# Patient Record
Sex: MALE | Race: WHITE | HISPANIC OR LATINO | Employment: FULL TIME | URBAN - METROPOLITAN AREA
[De-identification: names, ages, dates, MRNs, and addresses within clinical notes are randomized per-mention and may not be internally consistent; named-entity substitution may affect disease eponyms.]

---

## 2021-10-11 ENCOUNTER — PRE-ADMISSION TESTING (OUTPATIENT)
Dept: ADMISSIONS | Facility: MEDICAL CENTER | Age: 36
End: 2021-10-11
Attending: SURGERY
Payer: COMMERCIAL

## 2021-10-11 DIAGNOSIS — Z01.812 PRE-OPERATIVE LABORATORY EXAMINATION: ICD-10-CM

## 2021-10-11 LAB
ANION GAP SERPL CALC-SCNC: 9 MMOL/L (ref 7–16)
BASOPHILS # BLD AUTO: 0.5 % (ref 0–1.8)
BASOPHILS # BLD: 0.04 K/UL (ref 0–0.12)
BUN SERPL-MCNC: 13 MG/DL (ref 8–22)
CALCIUM SERPL-MCNC: 9.1 MG/DL (ref 8.5–10.5)
CHLORIDE SERPL-SCNC: 105 MMOL/L (ref 96–112)
CO2 SERPL-SCNC: 25 MMOL/L (ref 20–33)
CREAT SERPL-MCNC: 0.81 MG/DL (ref 0.5–1.4)
EOSINOPHIL # BLD AUTO: 0.08 K/UL (ref 0–0.51)
EOSINOPHIL NFR BLD: 1 % (ref 0–6.9)
ERYTHROCYTE [DISTWIDTH] IN BLOOD BY AUTOMATED COUNT: 42.1 FL (ref 35.9–50)
GLUCOSE SERPL-MCNC: 121 MG/DL (ref 65–99)
HCT VFR BLD AUTO: 51 % (ref 42–52)
HGB BLD-MCNC: 18 G/DL (ref 14–18)
IMM GRANULOCYTES # BLD AUTO: 0.03 K/UL (ref 0–0.11)
IMM GRANULOCYTES NFR BLD AUTO: 0.4 % (ref 0–0.9)
LYMPHOCYTES # BLD AUTO: 1.46 K/UL (ref 1–4.8)
LYMPHOCYTES NFR BLD: 17.4 % (ref 22–41)
MCH RBC QN AUTO: 32.3 PG (ref 27–33)
MCHC RBC AUTO-ENTMCNC: 35.3 G/DL (ref 33.7–35.3)
MCV RBC AUTO: 91.4 FL (ref 81.4–97.8)
MONOCYTES # BLD AUTO: 0.54 K/UL (ref 0–0.85)
MONOCYTES NFR BLD AUTO: 6.5 % (ref 0–13.4)
NEUTROPHILS # BLD AUTO: 6.22 K/UL (ref 1.82–7.42)
NEUTROPHILS NFR BLD: 74.2 % (ref 44–72)
NRBC # BLD AUTO: 0 K/UL
NRBC BLD-RTO: 0 /100 WBC
PLATELET # BLD AUTO: 250 K/UL (ref 164–446)
PMV BLD AUTO: 9.3 FL (ref 9–12.9)
POTASSIUM SERPL-SCNC: 4.3 MMOL/L (ref 3.6–5.5)
RBC # BLD AUTO: 5.58 M/UL (ref 4.7–6.1)
SARS-COV-2 RNA RESP QL NAA+PROBE: NOTDETECTED
SODIUM SERPL-SCNC: 139 MMOL/L (ref 135–145)
SPECIMEN SOURCE: NORMAL
WBC # BLD AUTO: 8.4 K/UL (ref 4.8–10.8)

## 2021-10-11 PROCEDURE — 80048 BASIC METABOLIC PNL TOTAL CA: CPT

## 2021-10-11 PROCEDURE — C9803 HOPD COVID-19 SPEC COLLECT: HCPCS

## 2021-10-11 PROCEDURE — U0005 INFEC AGEN DETEC AMPLI PROBE: HCPCS

## 2021-10-11 PROCEDURE — 85025 COMPLETE CBC W/AUTO DIFF WBC: CPT

## 2021-10-11 PROCEDURE — 36415 COLL VENOUS BLD VENIPUNCTURE: CPT

## 2021-10-11 PROCEDURE — U0003 INFECTIOUS AGENT DETECTION BY NUCLEIC ACID (DNA OR RNA); SEVERE ACUTE RESPIRATORY SYNDROME CORONAVIRUS 2 (SARS-COV-2) (CORONAVIRUS DISEASE [COVID-19]), AMPLIFIED PROBE TECHNIQUE, MAKING USE OF HIGH THROUGHPUT TECHNOLOGIES AS DESCRIBED BY CMS-2020-01-R: HCPCS

## 2021-10-13 ENCOUNTER — HOSPITAL ENCOUNTER (OUTPATIENT)
Facility: MEDICAL CENTER | Age: 36
End: 2021-10-13
Attending: SURGERY | Admitting: SURGERY
Payer: COMMERCIAL

## 2021-10-13 ENCOUNTER — ANESTHESIA (OUTPATIENT)
Dept: SURGERY | Facility: MEDICAL CENTER | Age: 36
End: 2021-10-13
Payer: COMMERCIAL

## 2021-10-13 ENCOUNTER — ANESTHESIA EVENT (OUTPATIENT)
Dept: SURGERY | Facility: MEDICAL CENTER | Age: 36
End: 2021-10-13
Payer: COMMERCIAL

## 2021-10-13 VITALS
WEIGHT: 187.39 LBS | HEART RATE: 73 BPM | TEMPERATURE: 98.5 F | DIASTOLIC BLOOD PRESSURE: 69 MMHG | BODY MASS INDEX: 28.4 KG/M2 | SYSTOLIC BLOOD PRESSURE: 115 MMHG | RESPIRATION RATE: 16 BRPM | OXYGEN SATURATION: 94 % | HEIGHT: 68 IN

## 2021-10-13 DIAGNOSIS — K42.9 UMBILICAL HERNIA WITHOUT OBSTRUCTION AND WITHOUT GANGRENE: ICD-10-CM

## 2021-10-13 PROCEDURE — 501838 HCHG SUTURE GENERAL: Performed by: SURGERY

## 2021-10-13 PROCEDURE — 160028 HCHG SURGERY MINUTES - 1ST 30 MINS LEVEL 3: Performed by: SURGERY

## 2021-10-13 PROCEDURE — 160025 RECOVERY II MINUTES (STATS): Performed by: SURGERY

## 2021-10-13 PROCEDURE — 160009 HCHG ANES TIME/MIN: Performed by: SURGERY

## 2021-10-13 PROCEDURE — 160046 HCHG PACU - 1ST 60 MINS PHASE II: Performed by: SURGERY

## 2021-10-13 PROCEDURE — 700102 HCHG RX REV CODE 250 W/ 637 OVERRIDE(OP): Performed by: ANESTHESIOLOGY

## 2021-10-13 PROCEDURE — 700101 HCHG RX REV CODE 250: Performed by: SURGERY

## 2021-10-13 PROCEDURE — 700111 HCHG RX REV CODE 636 W/ 250 OVERRIDE (IP): Performed by: ANESTHESIOLOGY

## 2021-10-13 PROCEDURE — 700105 HCHG RX REV CODE 258: Performed by: SURGERY

## 2021-10-13 PROCEDURE — 700101 HCHG RX REV CODE 250: Performed by: ANESTHESIOLOGY

## 2021-10-13 PROCEDURE — 160048 HCHG OR STATISTICAL LEVEL 1-5: Performed by: SURGERY

## 2021-10-13 PROCEDURE — A9270 NON-COVERED ITEM OR SERVICE: HCPCS | Performed by: ANESTHESIOLOGY

## 2021-10-13 PROCEDURE — 160002 HCHG RECOVERY MINUTES (STAT): Performed by: SURGERY

## 2021-10-13 PROCEDURE — 160035 HCHG PACU - 1ST 60 MINS PHASE I: Performed by: SURGERY

## 2021-10-13 PROCEDURE — 160039 HCHG SURGERY MINUTES - EA ADDL 1 MIN LEVEL 3: Performed by: SURGERY

## 2021-10-13 RX ORDER — HYDROMORPHONE HYDROCHLORIDE 1 MG/ML
0.1 INJECTION, SOLUTION INTRAMUSCULAR; INTRAVENOUS; SUBCUTANEOUS
Status: DISCONTINUED | OUTPATIENT
Start: 2021-10-13 | End: 2021-10-13 | Stop reason: HOSPADM

## 2021-10-13 RX ORDER — CELECOXIB 200 MG/1
400 CAPSULE ORAL ONCE
Status: COMPLETED | OUTPATIENT
Start: 2021-10-13 | End: 2021-10-13

## 2021-10-13 RX ORDER — BUPIVACAINE HYDROCHLORIDE AND EPINEPHRINE 5; 5 MG/ML; UG/ML
INJECTION, SOLUTION EPIDURAL; INTRACAUDAL; PERINEURAL
Status: DISCONTINUED
Start: 2021-10-13 | End: 2021-10-13 | Stop reason: HOSPADM

## 2021-10-13 RX ORDER — OXYCODONE HCL 5 MG/5 ML
5 SOLUTION, ORAL ORAL
Status: COMPLETED | OUTPATIENT
Start: 2021-10-13 | End: 2021-10-13

## 2021-10-13 RX ORDER — HYDROMORPHONE HYDROCHLORIDE 1 MG/ML
0.4 INJECTION, SOLUTION INTRAMUSCULAR; INTRAVENOUS; SUBCUTANEOUS
Status: DISCONTINUED | OUTPATIENT
Start: 2021-10-13 | End: 2021-10-13 | Stop reason: HOSPADM

## 2021-10-13 RX ORDER — ACETAMINOPHEN 500 MG
1000 TABLET ORAL ONCE
Status: COMPLETED | OUTPATIENT
Start: 2021-10-13 | End: 2021-10-13

## 2021-10-13 RX ORDER — LIDOCAINE HYDROCHLORIDE 20 MG/ML
INJECTION, SOLUTION EPIDURAL; INFILTRATION; INTRACAUDAL; PERINEURAL PRN
Status: DISCONTINUED | OUTPATIENT
Start: 2021-10-13 | End: 2021-10-13 | Stop reason: SURG

## 2021-10-13 RX ORDER — SODIUM CHLORIDE, SODIUM LACTATE, POTASSIUM CHLORIDE, CALCIUM CHLORIDE 600; 310; 30; 20 MG/100ML; MG/100ML; MG/100ML; MG/100ML
INJECTION, SOLUTION INTRAVENOUS CONTINUOUS
Status: DISCONTINUED | OUTPATIENT
Start: 2021-10-13 | End: 2021-10-13 | Stop reason: HOSPADM

## 2021-10-13 RX ORDER — PHENYLEPHRINE HCL IN 0.9% NACL 0.5 MG/5ML
SYRINGE (ML) INTRAVENOUS PRN
Status: DISCONTINUED | OUTPATIENT
Start: 2021-10-13 | End: 2021-10-13 | Stop reason: SURG

## 2021-10-13 RX ORDER — BUPIVACAINE HYDROCHLORIDE 5 MG/ML
INJECTION, SOLUTION EPIDURAL; INTRACAUDAL
Status: DISCONTINUED | OUTPATIENT
Start: 2021-10-13 | End: 2021-10-13 | Stop reason: HOSPADM

## 2021-10-13 RX ORDER — ROCURONIUM BROMIDE 10 MG/ML
INJECTION, SOLUTION INTRAVENOUS PRN
Status: DISCONTINUED | OUTPATIENT
Start: 2021-10-13 | End: 2021-10-13 | Stop reason: SURG

## 2021-10-13 RX ORDER — OXYCODONE HCL 5 MG/5 ML
10 SOLUTION, ORAL ORAL
Status: COMPLETED | OUTPATIENT
Start: 2021-10-13 | End: 2021-10-13

## 2021-10-13 RX ORDER — DEXAMETHASONE SODIUM PHOSPHATE 4 MG/ML
INJECTION, SOLUTION INTRA-ARTICULAR; INTRALESIONAL; INTRAMUSCULAR; INTRAVENOUS; SOFT TISSUE PRN
Status: DISCONTINUED | OUTPATIENT
Start: 2021-10-13 | End: 2021-10-13 | Stop reason: SURG

## 2021-10-13 RX ORDER — MEPERIDINE HYDROCHLORIDE 25 MG/ML
12.5 INJECTION INTRAMUSCULAR; INTRAVENOUS; SUBCUTANEOUS
Status: DISCONTINUED | OUTPATIENT
Start: 2021-10-13 | End: 2021-10-13 | Stop reason: HOSPADM

## 2021-10-13 RX ORDER — HYDROMORPHONE HYDROCHLORIDE 1 MG/ML
0.2 INJECTION, SOLUTION INTRAMUSCULAR; INTRAVENOUS; SUBCUTANEOUS
Status: DISCONTINUED | OUTPATIENT
Start: 2021-10-13 | End: 2021-10-13 | Stop reason: HOSPADM

## 2021-10-13 RX ORDER — HYDROCODONE BITARTRATE AND ACETAMINOPHEN 5; 325 MG/1; MG/1
1 TABLET ORAL EVERY 6 HOURS PRN
Qty: 16 TABLET | Refills: 0 | Status: SHIPPED | OUTPATIENT
Start: 2021-10-13 | End: 2021-10-17

## 2021-10-13 RX ORDER — HALOPERIDOL 5 MG/ML
1 INJECTION INTRAMUSCULAR
Status: DISCONTINUED | OUTPATIENT
Start: 2021-10-13 | End: 2021-10-13 | Stop reason: HOSPADM

## 2021-10-13 RX ORDER — CEFAZOLIN SODIUM 1 G/3ML
INJECTION, POWDER, FOR SOLUTION INTRAMUSCULAR; INTRAVENOUS PRN
Status: DISCONTINUED | OUTPATIENT
Start: 2021-10-13 | End: 2021-10-13 | Stop reason: SURG

## 2021-10-13 RX ORDER — MIDAZOLAM HYDROCHLORIDE 1 MG/ML
INJECTION INTRAMUSCULAR; INTRAVENOUS
Status: COMPLETED
Start: 2021-10-13 | End: 2021-10-13

## 2021-10-13 RX ORDER — ONDANSETRON 2 MG/ML
4 INJECTION INTRAMUSCULAR; INTRAVENOUS
Status: DISCONTINUED | OUTPATIENT
Start: 2021-10-13 | End: 2021-10-13 | Stop reason: HOSPADM

## 2021-10-13 RX ADMIN — CEFAZOLIN 2 G: 330 INJECTION, POWDER, FOR SOLUTION INTRAMUSCULAR; INTRAVENOUS at 14:16

## 2021-10-13 RX ADMIN — SODIUM CHLORIDE, POTASSIUM CHLORIDE, SODIUM LACTATE AND CALCIUM CHLORIDE: 600; 310; 30; 20 INJECTION, SOLUTION INTRAVENOUS at 14:06

## 2021-10-13 RX ADMIN — PROPOFOL 200 MG: 10 INJECTION, EMULSION INTRAVENOUS at 14:16

## 2021-10-13 RX ADMIN — SUGAMMADEX 200 MG: 100 INJECTION, SOLUTION INTRAVENOUS at 14:58

## 2021-10-13 RX ADMIN — DEXAMETHASONE SODIUM PHOSPHATE 8 MG: 4 INJECTION, SOLUTION INTRA-ARTICULAR; INTRALESIONAL; INTRAMUSCULAR; INTRAVENOUS; SOFT TISSUE at 14:22

## 2021-10-13 RX ADMIN — FENTANYL CITRATE 25 MCG: 50 INJECTION INTRAMUSCULAR; INTRAVENOUS at 15:30

## 2021-10-13 RX ADMIN — ACETAMINOPHEN 1000 MG: 500 TABLET ORAL at 14:04

## 2021-10-13 RX ADMIN — MIDAZOLAM 2 MG: 1 INJECTION INTRAMUSCULAR; INTRAVENOUS at 14:08

## 2021-10-13 RX ADMIN — FENTANYL CITRATE 150 MCG: 50 INJECTION, SOLUTION INTRAMUSCULAR; INTRAVENOUS at 14:16

## 2021-10-13 RX ADMIN — OXYCODONE HYDROCHLORIDE 5 MG: 5 SOLUTION ORAL at 15:30

## 2021-10-13 RX ADMIN — Medication 100 MCG: at 14:48

## 2021-10-13 RX ADMIN — CELECOXIB 400 MG: 200 CAPSULE ORAL at 14:04

## 2021-10-13 RX ADMIN — ROCURONIUM BROMIDE 50 MG: 10 INJECTION, SOLUTION INTRAVENOUS at 14:16

## 2021-10-13 RX ADMIN — Medication 200 MCG: at 14:55

## 2021-10-13 RX ADMIN — LIDOCAINE HYDROCHLORIDE 80 MG: 20 INJECTION, SOLUTION EPIDURAL; INFILTRATION; INTRACAUDAL at 14:16

## 2021-10-13 ASSESSMENT — PAIN DESCRIPTION - PAIN TYPE
TYPE: SURGICAL PAIN

## 2021-10-13 ASSESSMENT — PAIN SCALES - GENERAL: PAIN_LEVEL: 3

## 2021-10-13 NOTE — ANESTHESIA PREPROCEDURE EVALUATION
Relevant Problems   No relevant active problems   umbilical hernia    Physical Exam    Airway   Mallampati: II  TM distance: >3 FB  Neck ROM: full       Cardiovascular - normal exam  Rhythm: regular  Rate: normal  (-) murmur     Dental - normal exam           Pulmonary - normal exam  Breath sounds clear to auscultation     Abdominal    Neurological - normal exam                 Anesthesia Plan    ASA 2       Plan - general       Airway plan will be ETT          Induction: intravenous    Postoperative Plan: Postoperative administration of opioids is intended.    Pertinent diagnostic labs and testing reviewed    Informed Consent:    Anesthetic plan and risks discussed with patient.

## 2021-10-13 NOTE — OR SURGEON
Immediate Post OP Note    PreOp Diagnosis: Symptomatic umbilical hernia.      PostOp Diagnosis: Same.      Procedure(s):  REPAIR, HERNIA, UMBILICAL - Wound Class: Clean Contaminated    Surgeon(s):  Jordyn Auguste M.D.    Anesthesiologist/Type of Anesthesia:  Anesthesiologist: Kameron Hurst M.D./General    Surgical Staff:  Circulator: Lakesha Spaulding R.N.; Lila Harvey R.N.  Relief Circulator: Evaristo Mc R.N.  Scrub Person: Lisa Whalen; Ekaterina Guadarrama    Specimens removed if any: None.  * No specimens in log *    Estimated Blood Loss: 2ml.    IVF: 800,;/    Findings: Umbilical hernia with thin overlying skin.    Complications: None.    Dictated, #[id number].        10/13/2021 3:35 PM Jordyn Auguste M.D.

## 2021-10-13 NOTE — OR NURSING
1513 - Received patient from OR. Report from Anesthesiologist and OR RN. Patient on 4lpm at 97% O2. VSS. Monitor connected. Oral airway in place. S/P umbilical hernia repair, incision JOEL, dressing CDI    1523 - Pt awake, Oral airway removed    1525 - [facility] certified  @ bedside; pt c/o surgical pain as 2/10    1530 - Medicated pt with 5mg PO Oxycodone & 25mcg IV fentanyl; pt tolerating sips of water    1533 - [facility]     1600 - Phase II criteria met; Phase I complete    1615 - DC instructions provided to a reviewed with pt's family member via [facility] certified     1632 - Patient escorted out to responsible adult via wheelchair by CNA. Belongings with patient, ambulated with steady gait. Discharge paperwork and instructions reviewed, signed, and sent with patient.

## 2021-10-13 NOTE — ANESTHESIA POSTPROCEDURE EVALUATION
Patient: Vance Arnold    Procedure Summary     Date: 10/13/21 Room / Location: [facility] ROOM 21 / SURGERY SAME DAY [facility]    Anesthesia Start: 1411 Anesthesia Stop: 1519    Procedure: REPAIR, HERNIA, UMBILICAL (N/A Abdomen) Diagnosis: (UMBILICAL HERNIA)    Surgeons: Jordyn Auguste M.D. Responsible Provider: Kameron Hurst M.D.    Anesthesia Type: general ASA Status: 2          Final Anesthesia Type: general  Last vitals  BP   Blood Pressure: (!) 81/46    Temp   36.9 °C (98.5 °F)    Pulse   (!) 101   Resp   16    SpO2   98 %      Anesthesia Post Evaluation    Patient location during evaluation: PACU  Patient participation: complete - patient participated  Level of consciousness: awake and alert  Pain score: 3    Airway patency: patent  Anesthetic complications: no  Cardiovascular status: hemodynamically stable  Respiratory status: acceptable  Hydration status: euvolemic    PONV: none          No complications documented.

## 2021-10-13 NOTE — OP REPORT
DATE OF SERVICE:  10/13/2021     SURGEON:  Jordyn Auguste MD     ANESTHESIOLOGIST:  Kameron Hurst MD     TYPE OF ANESTHESIA:  General anesthesia.     PREOPERATIVE DIAGNOSIS:  Symptomatic umbilical hernia.     POSTOPERATIVE DIAGNOSIS:  Symptomatic umbilical hernia.     PROCEDURE:  Direct open repair of symptomatic umbilical hernia.     INDICATIONS FOR PROCEDURE:  This is a pleasant gentleman with severe   psoriasis, who was referred to see me for symptomatic umbilical hernia.    Discussion was made with the patient.  He would like to undergo open repair,   fully understanding all risks.  Due to patient having significant psoriasis, a   mesh would not be placed since there would be at higher risk of mesh   infection.     Preoperatively, I informed the patient and his family member that due to   patient having significant thinning of the skin overlying the hernia, there is   a chance that he may develop skin necrosis following the repair.  They   indicated understanding and would like to proceed with surgery.     DESCRIPTION OF PROCEDURE:  Informed consent was obtained, the patient was   taken to the operating room and was placed in the supine position.  Sequential   compression devices were applied.  The patient was given Ancef intravenously.    General anesthesia was induced.     Next, the patient's abdomen was sterilely prepped and draped in the normal   fashion.  A timeout procedure was done.  An infraumbilical incision was made.    The incision was extended through the subcutaneous tissue using the   electrocautery.  There was significant thinning of the overlying skin.  There   was incarceration of the omentum.  The omentum was reduced out of the hernia   sac and reduced back into the peritoneal cavity.  The hernia defect was   approximately 1.5 cm in size.  The subcutaneous tissue was cleared off the   fascia.  The fascia defect was then closed in a vest-over-pants manner using 0   Prolene sutures.      Next, the thin skin was resected.  There was still some thinning of the   overlying skin left over.  The umbilicus was tacked down to the fascia using   interrupted 3-0 Vicryl sutures.  The wound was anesthetized with 20 mL of 0.5%   Marcaine solution.  The wound was closed subcutaneously with running 3-0   Vicryl sutures and subcuticularly with 4-0 Monocryl suture.  The wound was   cleaned and sterile dressing was applied.     ESTIMATED BLOOD LOSS:  2 mL     INTRAVENOUS FLUIDS:  800 mL crystalloids.     COUNTS:  Sponge and instrument count was correct x2.  The patient was then   awakened, extubated, and taken to recovery area in stable condition.     I again contacted the patient's family member postoperatively and informed   them that even though I trimmed back the thin skin overlying the hernia, there   was still some thin skin left and there is a chance that patient may develop   skin necrosis.  They indicated understanding.  All questions were answered.        ______________________________  MD LYDIA Grissom/FELA    DD:  10/13/2021 15:43  DT:  10/13/2021 16:54    Job#:  [id number]

## 2021-10-13 NOTE — DISCHARGE INSTRUCTIONS
Hernia umbilical en los adultos  Umbilical Hernia, Adult    Carolin hernia es un bulto de tejido que sobresale a través de carolin abertura entre los músculos. Carolin hernia umbilical se produce en el abdomen, cerca del ombligo. La hernia puede contener tejidos del intestino torres, el intestino grueso o tejido graso que recubre el intestino (epiplón). Las hernias umbilicales en los adultos suelen empeorar con el tiempo y requieren tratamiento quirúrgico.  Hay varios tipos de hernias umbilicales. Es posible que tenga lo siguiente:  · Carolin hernia ubicada trish por debajo o por arriba del ombligo (hernia indirecta). Es el tipo de hernia umbilical más frecuente en los adultos.  · Carolin hernia que se forma a través de carolin abertura hecha por el ombligo (hernia directa).  · Carolin hernia que aparece y desaparece (hernia reducible). Carolin hernia reducible puede ser visible solo al hacer fuerza, levantar objetos pesados o toser. Rakesh tipo de hernia se puede reintroducir en el abdomen (reducir).  · Carolin hernia que aprisiona tejido abdominal (hernia encarcelada). Rakesh tipo de hernia es irreducible.  · Carolin hernia que interrumpe el flujo de puja a los tejidos en chambers interior (hernia estrangulada). Si esto ocurre, los tejidos pueden empezar a morir. Rakesh tipo de hernia requiere tratamiento de emergencia.  ¿Cuáles son las causas?  Carolin hernia umbilical se produce cuando el tejido dentro del abdomen ejerce presión sobre carolin art debilitada de los músculos abdominales.  ¿Qué incrementa el riesgo?  Puede correr un mayor riesgo de tener esta afección en los siguientes casos:  · Tiene obesidad.  · Tuvo varios embarazos.  · Tiene carolin acumulación de líquido dentro del abdomen (ascitis).  · Se sometió a carolin cirugía que debilita los músculos abdominales.  ¿Cuáles son los signos o síntomas?  El principal síntoma de esta afección es un bulto en el ombligo o cerca de rakesh que no causa dolor. Carolin hernia reducible puede ser visible solo al hacer fuerza, levantar  objetos pesados o toser. Otros síntomas pueden incluir lo siguiente:  · Dolor sordo.  · Sensación de opresión.  Los síntomas de carolin hernia estrangulada pueden incluir los siguientes:  · Dolor que se vuelve cada vez más intenso.  · Náuseas y vómitos.  · Dolor al ejercer presión sobre la hernia.  · Cambio de color de la piel que recubre la hernia que se torna lizzette o violácea.  · Estreñimiento.  · Zi en las heces.  ¿Cómo se diagnostica?  Esta afección se puede diagnosticar en función de lo siguiente:  · Un examen físico. Pueden pedirle que tosa o que alma fuerza mientras está de pie. Estas acciones aumentan la presión dentro del abdomen y empujan la hernia a través de la abertura en los músculos. El médico puede ejercer presión sobre la hernia para tratar de reducirla.  · Los síntomas y los antecedentes médicos.  ¿Cómo se trata?  La cirugía es el único tratamiento para carolin hernia umbilical. En el jaylen de que la hernia esté estrangulada, esta se realiza lo antes posible. Si tiene carolin hernia pequeña que no está encarcelada, ashley vez tenga que bajar de peso antes de la cirugía.  Siga estas indicaciones en chambers casa:  · Baje de peso, si se lo indicó el médico.  · No trate de reintroducir la hernia.  · Observe si la hernia cambia de color o de tamaño. Infórmele al médico si se producen cambios.  · Ashley vez deba evitar las actividades que aumentan la presión sobre la hernia.  · No levante objetos que pesen más de 10 libras (4.5 kg) hasta que el médico le diga que es seguro.  · [place] los medicamentos de venta denis y los recetados solamente urdy se lo haya indicado el médico.  · Concurra a todas las visitas de control rudy se lo haya indicado el médico. [place] es importante.  Comuníquese con un médico si:  · La hernia se agranda.  · La hernia le causa dolor.  Solicite ayuda de inmediato si:  · Tiene un dolor intenso y repentino cerca de la art de la hernia.  · Tiene dolor, así rudy náuseas o vómitos.  · Tiene dolor y la piel  que recubre la hernia cambia de color.  · Presenta fiebre.  Instrucciones Para [place]  (Home Care Instructions)    ACTIVIDAD: Descanse y tome todo con mucha calma las primeras 24 horas después de chambers cirugía.  Carolin persona adulta responsable debe permanecer con usted soni dejon periodo de tiempo.  Es normal sentirse sonoliento o sonolienta soni esas primeras horas.  Le recomendamos que no richard nada que requiera equilibrio, beth decisiones a mucha coordinación de chambers parte.    NO RICHARD ESTO PURANTE LAS PRIMERAS 24 HORAS:   Manejar o conducir algún vehiculo, operar maquinarias o utilizar electrodomesticos.   Beber cerveza o algún otro tipo de bebida alcohólica.   Beth decisiones importantes o firmar documentos legales.    INSTRUCCIONES ESPECIALES:  1) Actividad: Según la tolerancia, excepto que no debe levantar más de 10 libras soni 4 semanas. Puede quitar los vendajes después de 3 días y ducharse. Sin baño ni bañera de hidromasaje soni 2 semanas. 2) [place]an[place] los medicamentos caseros. Puede beth Tylenol o ibuprofeno según sea necesario para el dolor leve. Para el dolor moderado, tome Norco según lo prescrito. Puede beth un ablandador de heces de venta denis o un laxante suave según sea necesario. 3) Richard un seguimiento con el Dr. Auguste en 2 semanas. Llame al [phone] para concertar carolin finesse y para cualquier problema.    DIETA: Para evitar las nauseas, prosiga despacito con chambers dieta a medida que pueda ir tolerándola mejor, evite comidas muy condimentadas o grasosas soni dejon primer día.  Vaya agregando comidas más substanciadas a chambers dieta a medida que asi lo indique chambers médica.  Los bebés pueden beber leche preparada o formula, ásl rudy también leche del seno de la madre a medida que vayan teniendo hambre.  SIGA AGREGANDO LIQUIDOS Y COMIDAS CON FIBRA PARA EVITAR ESTREÑIMIENTO.    MEDICAMENTOS/MEDICINAS:  Vuelva a beth oli medicamentos diarios.  [place] los medicamentos que se le prescribe con un poco de  comida.  Si no le prescribe ningún tipo de medicamento, entonces puede nicki medicinas para el dolor que no contienen aspirina, si las necesita.  LAS MEDICINAS PARA EL DOLOR PUEDEN ESTREÑIRLE MUCHO.  [place] un suavizante para el excremento o materia fecal (stool softener) o un laxativo rudy por ejemplo: senokot, pericolase, o leche de magnesia, si lo necesita.    La prescripción la administro Hydrocodone - [place] 1 tableta por vía oral cada 6 horas según sea necesario hasta por 4 días.  La ultima sosis de medicina para el dolor fue administrada 3:30pm, Próxima dosis a las 7:30 pm.     Se debe hacer carolin consulta medica con el doctor; Líame para hacer la finesse.    Usted debe LIAMAR A CHAMBERS MEDICO si tiene los siguientes síntomas:   -   Carolin fiebre más nahomi de 101 grados Fahrenheit.   -   Un dolor incesante aún con los medicamentos, o nauseas y vómito persistente.   -   Un sangrado excesivo (puja que traspasa los vendajes o gasas) o algúln tipo de drenaje inesperado que proviene de la henda.     -   Un color masterson exagerado o hinchazón alrededor del área en donde se le hizo incisión o danial, o un drenaje de pus o con olor anabella proveniente de la henda.   -    La inhabilidad de orinar o vaciar chambers vejiga en 8 horas.   -    Problemas con a respiración o josiah en el pecho.    Usted debe llamar al 911 si se presentan problemas con el dolor al respirar o el pecho.  Si no se puede ponnoer en comunicación con un medica o con el centro de cirugía, usted debe ir a la estación de emergencia (emergency room) más cercana o a un centro de atención de urgencia (urgent care center).  El teléfono del medico es: [phone]    LOS SÍNTOMAS DE UN LEVE RESFRIO SON MUY NORMALES.  ADEMÁS USTED PUEDE LLEGAR A SENTIR JOSIAH GENERALES DE MÚSCULOS, IRRITACIÓN EN LA GARGANT[place], JOSIAH DE GUSTAVO Y/O UN POCO DE NAUSEAS.    Sie tiene alguna pregunta, llame a chambers médico.  Si chambers médico no se encuentra disponible, por favor llame al Centro de Cirugía at  [phone].  el Centro está abierto de Lunes a Viernes desde las 7:00 de la manana hasta las 5:00 de la noche.  Mulu también puede llamar al CENTRO DE LLAMADAS SOBRE LA JEANETTE o HEALTH HOTLINE.  Yana está abierto viente y cuatro horas por larissa, siete pond por semana, allí podrá hablar con carolin enfermera.  Llame al [phone], o al número gratis [phone].    Mi firma a continuación indica que he recibido y entiendco estas instrucciones acera de los cuidados en la casa (Home Care Instructions)    Usartem recibirá carolin encuesta en la correspondencia en las siguientes semanas y le pedimos que por favor tome un momento para completar mervin encuesta y regresaría a hosotros.  Nuestro objetivó es brindarle un cuidado muy ramirez y par lo tanto apreciamos oli coméntanos.  Muchas reina por mc escogido el Centro [facility] Cirugía de [facility].

## 2021-10-13 NOTE — ANESTHESIA TIME REPORT
Anesthesia Start and Stop Event Times     Date Time Event    10/13/2021 1350 Ready for Procedure     1411 Anesthesia Start     1519 Anesthesia Stop        Responsible Staff  10/13/21    Name Role Begin End    Kameron Hurst M.D. Anesth 1411 1519        Preop Diagnosis (Free Text):  Pre-op Diagnosis     K42.9        Preop Diagnosis (Codes):    Premium Reason  A. 3PM - 7AM    Comments:

## 2021-10-13 NOTE — ANESTHESIA PROCEDURE NOTES
Airway    Date/Time: 10/13/2021 2:18 PM  Performed by: Kameron Hurst M.D.  Authorized by: Kameron Hurst M.D.     Location:  OR  Urgency:  Elective  Indications for Airway Management:  Anesthesia      Spontaneous Ventilation: absent    Sedation Level:  Deep  Preoxygenated: Yes    Patient Position:  Sniffing  Mask Difficulty Assessment:  1 - vent by mask  Final Airway Type:  Endotracheal airway  Final Endotracheal Airway:  ETT  Cuffed: Yes    Technique Used for Successful ETT Placement:  Direct laryngoscopy  Devices/Methods Used in Placement:  Intubating stylet    Insertion Site:  Oral  Blade Type:  [place]  Laryngoscope Blade/Videolaryngoscope Blade Size:  2  ETT Size (mm):  7.5  Measured from:  Teeth  ETT to Teeth (cm):  25  Placement Verified by: auscultation and capnometry    Cormack-Lehane Classification:  Grade IIa - partial view of glottis  Number of Attempts at Approach:  1

## (undated) DEVICE — LACTATED RINGERS INJ 1000 ML - (14EA/CA 60CA/PF)

## (undated) DEVICE — HEAD HOLDER JUNIOR/ADULT

## (undated) DEVICE — SPONGE GAUZESTER 4 X 4 4PLY - (128PK/CA)

## (undated) DEVICE — SUCTION INSTRUMENT YANKAUER BULBOUS TIP W/O VENT (50EA/CA)

## (undated) DEVICE — TUBING CLEARLINK DUO-VENT - C-FLO (48EA/CA)

## (undated) DEVICE — CANISTER SUCTION 3000ML MECHANICAL FILTER AUTO SHUTOFF MEDI-VAC NONSTERILE LF DISP  (40EA/CA)

## (undated) DEVICE — PROTECTOR ULNA NERVE - (36PR/CA)

## (undated) DEVICE — SENSOR SPO2 NEO LNCS ADHESIVE (20/BX) SEE USER NOTES

## (undated) DEVICE — PACK MINOR BASIN - (2EA/CA)

## (undated) DEVICE — CANISTER SUCTION RIGID RED 1500CC (40EA/CA)

## (undated) DEVICE — SUTURE 2-0 PROL CT-2 (36PK/BX)

## (undated) DEVICE — GOWN WARMING STANDARD FLEX - (30/CA)

## (undated) DEVICE — KIT ANESTHESIA W/CIRCUIT & 3/LT BAG W/FILTER (20EA/CA)

## (undated) DEVICE — SUTURE 3-0 VICRYL PLUS SH - 8X 18 INCH (12/BX)

## (undated) DEVICE — SET LEADWIRE 5 LEAD BEDSIDE DISPOSABLE ECG (1SET OF 5/EA)

## (undated) DEVICE — GLOVE SZ 7 BIOGEL PI MICRO - PF LF (50PR/BX 4BX/CA)

## (undated) DEVICE — SODIUM CHL IRRIGATION 0.9% 1000ML (12EA/CA)

## (undated) DEVICE — MASK ANESTHESIA ADULT  - (100/CA)

## (undated) DEVICE — ELECTRODE 850 FOAM ADHESIVE - HYDROGEL RADIOTRNSPRNT (50/PK)

## (undated) DEVICE — DRAPE LAPAROTOMY T SHEET - (12EA/CA)

## (undated) DEVICE — GLOVE BIOGEL SZ 7 SURGICAL PF LTX - (50PR/BX 4BX/CA)

## (undated) DEVICE — SUTURE GENERAL

## (undated) DEVICE — SLEEVE VASO CALF MED - (10PR/CA)

## (undated) DEVICE — CATHETER IV 20 GA X 1-1/4 ---SURG.& SDS ONLY--- (50EA/BX)

## (undated) DEVICE — ELECTRODE DUAL RETURN W/ CORD - (50/PK)

## (undated) DEVICE — NEPTUNE 4 PORT MANIFOLD - (20/PK)

## (undated) DEVICE — BLADE SURGICAL #11 - (50/BX)

## (undated) DEVICE — CHLORAPREP 26 ML APPLICATOR - ORANGE TINT(25/CA)

## (undated) DEVICE — SUTURE 4-0 MONOCRYL PLUS PS-2 - 27 INCH (36/BX)

## (undated) DEVICE — TUBE CONNECTING SUCTION - CLEAR PLASTIC STERILE 72 IN (50EA/CA)

## (undated) DEVICE — GLOVE BIOGEL INDICATOR SZ 7.5 SURGICAL PF LTX - (50PR/BX 4BX/CA)

## (undated) DEVICE — TOWEL STOP TIMEOUT SAFETY FLAG (40EA/CA)

## (undated) DEVICE — KIT  I.V. START (100EA/CA)

## (undated) DEVICE — Device